# Patient Record
Sex: FEMALE | Race: BLACK OR AFRICAN AMERICAN | Employment: FULL TIME | URBAN - METROPOLITAN AREA
[De-identification: names, ages, dates, MRNs, and addresses within clinical notes are randomized per-mention and may not be internally consistent; named-entity substitution may affect disease eponyms.]

---

## 2022-08-24 ENCOUNTER — HOSPITAL ENCOUNTER (EMERGENCY)
Age: 62
Discharge: HOME | End: 2022-08-25
Attending: EMERGENCY MEDICINE
Payer: OTHER GOVERNMENT

## 2022-08-24 ENCOUNTER — APPOINTMENT (OUTPATIENT)
Dept: GENERAL RADIOLOGY | Age: 62
End: 2022-08-24
Attending: EMERGENCY MEDICINE
Payer: OTHER GOVERNMENT

## 2022-08-24 DIAGNOSIS — M62.82 STATIN-INDUCED RHABDOMYOLYSIS: ICD-10-CM

## 2022-08-24 DIAGNOSIS — R07.89 ATYPICAL CHEST PAIN: Primary | ICD-10-CM

## 2022-08-24 DIAGNOSIS — K21.9 GASTROESOPHAGEAL REFLUX DISEASE, UNSPECIFIED WHETHER ESOPHAGITIS PRESENT: ICD-10-CM

## 2022-08-24 DIAGNOSIS — R74.8 ELEVATED CPK: ICD-10-CM

## 2022-08-24 DIAGNOSIS — E87.6 HYPOKALEMIA: ICD-10-CM

## 2022-08-24 DIAGNOSIS — T46.6X5A STATIN-INDUCED RHABDOMYOLYSIS: ICD-10-CM

## 2022-08-24 LAB
ALBUMIN SERPL-MCNC: 3.9 G/DL (ref 3.4–5)
ALBUMIN/GLOB SERPL: 0.8 {RATIO} (ref 0.8–1.7)
ALP SERPL-CCNC: 68 U/L (ref 45–117)
ALT SERPL-CCNC: 40 U/L (ref 13–56)
ANION GAP SERPL CALC-SCNC: 8 MMOL/L (ref 3–18)
AST SERPL-CCNC: 43 U/L (ref 10–38)
BASOPHILS # BLD: 0.1 K/UL (ref 0–0.1)
BASOPHILS NFR BLD: 1 % (ref 0–2)
BILIRUB SERPL-MCNC: 0.9 MG/DL (ref 0.2–1)
BUN SERPL-MCNC: 20 MG/DL (ref 7–18)
BUN/CREAT SERPL: 19 (ref 12–20)
CALCIUM SERPL-MCNC: 10.2 MG/DL (ref 8.5–10.1)
CHLORIDE SERPL-SCNC: 100 MMOL/L (ref 100–111)
CK SERPL-CCNC: 956 U/L (ref 26–192)
CO2 SERPL-SCNC: 30 MMOL/L (ref 21–32)
CREAT SERPL-MCNC: 1.03 MG/DL (ref 0.6–1.3)
DIFFERENTIAL METHOD BLD: ABNORMAL
EOSINOPHIL # BLD: 0.1 K/UL (ref 0–0.4)
EOSINOPHIL NFR BLD: 2 % (ref 0–5)
ERYTHROCYTE [DISTWIDTH] IN BLOOD BY AUTOMATED COUNT: 13.9 % (ref 11.6–14.5)
GLOBULIN SER CALC-MCNC: 4.8 G/DL (ref 2–4)
GLUCOSE SERPL-MCNC: 112 MG/DL (ref 74–99)
HCT VFR BLD AUTO: 42 % (ref 35–45)
HGB BLD-MCNC: 13.9 G/DL (ref 12–16)
IMM GRANULOCYTES # BLD AUTO: 0 K/UL (ref 0–0.04)
IMM GRANULOCYTES NFR BLD AUTO: 0 % (ref 0–0.5)
LIPASE SERPL-CCNC: 138 U/L (ref 73–393)
LYMPHOCYTES # BLD: 2.7 K/UL (ref 0.9–3.6)
LYMPHOCYTES NFR BLD: 39 % (ref 21–52)
MCH RBC QN AUTO: 28.3 PG (ref 24–34)
MCHC RBC AUTO-ENTMCNC: 33.1 G/DL (ref 31–37)
MCV RBC AUTO: 85.5 FL (ref 78–100)
MONOCYTES # BLD: 0.6 K/UL (ref 0.05–1.2)
MONOCYTES NFR BLD: 9 % (ref 3–10)
NEUTS SEG # BLD: 3.4 K/UL (ref 1.8–8)
NEUTS SEG NFR BLD: 49 % (ref 40–73)
NRBC # BLD: 0 K/UL (ref 0–0.01)
NRBC BLD-RTO: 0 PER 100 WBC
PLATELET # BLD AUTO: 422 K/UL (ref 135–420)
PMV BLD AUTO: 9.5 FL (ref 9.2–11.8)
POTASSIUM SERPL-SCNC: 3.1 MMOL/L (ref 3.5–5.5)
PROT SERPL-MCNC: 8.7 G/DL (ref 6.4–8.2)
RBC # BLD AUTO: 4.91 M/UL (ref 4.2–5.3)
SODIUM SERPL-SCNC: 138 MMOL/L (ref 136–145)
TROPONIN-HIGH SENSITIVITY: 8 NG/L (ref 0–54)
WBC # BLD AUTO: 6.9 K/UL (ref 4.6–13.2)

## 2022-08-24 PROCEDURE — 93005 ELECTROCARDIOGRAM TRACING: CPT

## 2022-08-24 PROCEDURE — 96374 THER/PROPH/DIAG INJ IV PUSH: CPT

## 2022-08-24 PROCEDURE — 71045 X-RAY EXAM CHEST 1 VIEW: CPT

## 2022-08-24 PROCEDURE — 83690 ASSAY OF LIPASE: CPT

## 2022-08-24 PROCEDURE — 85025 COMPLETE CBC W/AUTO DIFF WBC: CPT

## 2022-08-24 PROCEDURE — 82550 ASSAY OF CK (CPK): CPT

## 2022-08-24 PROCEDURE — 74011250636 HC RX REV CODE- 250/636: Performed by: EMERGENCY MEDICINE

## 2022-08-24 PROCEDURE — 74011000250 HC RX REV CODE- 250: Performed by: EMERGENCY MEDICINE

## 2022-08-24 PROCEDURE — 84484 ASSAY OF TROPONIN QUANT: CPT

## 2022-08-24 PROCEDURE — 80053 COMPREHEN METABOLIC PANEL: CPT

## 2022-08-24 PROCEDURE — 74011250637 HC RX REV CODE- 250/637: Performed by: EMERGENCY MEDICINE

## 2022-08-24 RX ORDER — ACETAMINOPHEN 325 MG/1
975 TABLET ORAL
Status: COMPLETED | OUTPATIENT
Start: 2022-08-24 | End: 2022-08-24

## 2022-08-24 RX ORDER — GUAIFENESIN 100 MG/5ML
324 LIQUID (ML) ORAL
Status: COMPLETED | OUTPATIENT
Start: 2022-08-24 | End: 2022-08-24

## 2022-08-24 RX ORDER — FAMOTIDINE 10 MG/ML
20 INJECTION INTRAVENOUS
Status: COMPLETED | OUTPATIENT
Start: 2022-08-24 | End: 2022-08-24

## 2022-08-24 RX ADMIN — ALUMINA, MAGNESIA, AND SIMETHICONE ORAL SUSPENSION REGULAR STRENGTH 40 ML: 1200; 1200; 120 SUSPENSION ORAL at 22:46

## 2022-08-24 RX ADMIN — ACETAMINOPHEN 975 MG: 325 TABLET ORAL at 22:46

## 2022-08-24 RX ADMIN — FAMOTIDINE 20 MG: 10 INJECTION, SOLUTION INTRAVENOUS at 22:46

## 2022-08-24 RX ADMIN — ASPIRIN 324 MG: 81 TABLET, CHEWABLE ORAL at 22:46

## 2022-08-25 VITALS
DIASTOLIC BLOOD PRESSURE: 69 MMHG | HEIGHT: 68 IN | BODY MASS INDEX: 26.52 KG/M2 | TEMPERATURE: 98.3 F | SYSTOLIC BLOOD PRESSURE: 113 MMHG | RESPIRATION RATE: 17 BRPM | OXYGEN SATURATION: 97 % | HEART RATE: 53 BPM | WEIGHT: 175 LBS

## 2022-08-25 LAB
ATRIAL RATE: 55 BPM
ATRIAL RATE: 67 BPM
CALCULATED P AXIS, ECG09: 0 DEGREES
CALCULATED P AXIS, ECG09: 48 DEGREES
CALCULATED R AXIS, ECG10: 9 DEGREES
CALCULATED R AXIS, ECG10: 9 DEGREES
CALCULATED T AXIS, ECG11: 26 DEGREES
CALCULATED T AXIS, ECG11: 28 DEGREES
DIAGNOSIS, 93000: NORMAL
DIAGNOSIS, 93000: NORMAL
P-R INTERVAL, ECG05: 164 MS
P-R INTERVAL, ECG05: 174 MS
Q-T INTERVAL, ECG07: 432 MS
Q-T INTERVAL, ECG07: 464 MS
QRS DURATION, ECG06: 96 MS
QRS DURATION, ECG06: 96 MS
QTC CALCULATION (BEZET), ECG08: 443 MS
QTC CALCULATION (BEZET), ECG08: 456 MS
TROPONIN-HIGH SENSITIVITY: 7 NG/L (ref 0–54)
VENTRICULAR RATE, ECG03: 55 BPM
VENTRICULAR RATE, ECG03: 67 BPM

## 2022-08-25 PROCEDURE — 96361 HYDRATE IV INFUSION ADD-ON: CPT

## 2022-08-25 PROCEDURE — 93005 ELECTROCARDIOGRAM TRACING: CPT

## 2022-08-25 PROCEDURE — 99285 EMERGENCY DEPT VISIT HI MDM: CPT

## 2022-08-25 PROCEDURE — 74011250636 HC RX REV CODE- 250/636: Performed by: EMERGENCY MEDICINE

## 2022-08-25 PROCEDURE — 74011250637 HC RX REV CODE- 250/637: Performed by: EMERGENCY MEDICINE

## 2022-08-25 PROCEDURE — 84484 ASSAY OF TROPONIN QUANT: CPT

## 2022-08-25 RX ORDER — PANTOPRAZOLE SODIUM 40 MG/1
40 TABLET, DELAYED RELEASE ORAL 2 TIMES DAILY
Qty: 60 TABLET | Refills: 0 | Status: SHIPPED | OUTPATIENT
Start: 2022-08-25 | End: 2022-08-25

## 2022-08-25 RX ORDER — PANTOPRAZOLE SODIUM 40 MG/1
40 TABLET, DELAYED RELEASE ORAL 2 TIMES DAILY
Qty: 60 TABLET | Refills: 0 | Status: SHIPPED | OUTPATIENT
Start: 2022-08-25 | End: 2022-09-24

## 2022-08-25 RX ADMIN — POTASSIUM BICARBONATE 40 MEQ: 782 TABLET, EFFERVESCENT ORAL at 00:27

## 2022-08-25 RX ADMIN — SODIUM CHLORIDE 500 ML: 9 INJECTION, SOLUTION INTRAVENOUS at 00:27

## 2022-08-25 NOTE — ED TRIAGE NOTES
Pt arrives to ed reporting mid sternal chest pain that began this morning and has been off and on all day.

## 2022-08-25 NOTE — ED NOTES
C/O constant midsternal CP beginning this AM and increasing throughout the day. Denies PMH/Cardiac hx; Endorses increased SOB;  Denies radiation of pain/fever/chills/N/V.

## 2022-08-25 NOTE — ED PROVIDER NOTES
EMERGENCY DEPARTMENT HISTORY AND PHYSICAL EXAM    Date: 8/24/2022  Patient Name: Sander Griffith    History of Presenting Illness     Chief Complaint   Patient presents with    Chest Pain         History Provided By: Patient    Additional History (Context):   10:20 PM  Sander Griffith is a 65 y.o. female with PMHX of HTN, hyperlipidemia, hypothyroidism, migraines, COVID infection who presents to the emergency department C/O chest pain. Patient states she woke this morning she has had waxing and waning pain throughout the day. She describes it as a burning sensation and soreness without provocation. Happens at rest or when she is exerting herself. She also notes that its worse when lying flat. Denies any heart disease or family history of premature heart disease. She is not a smoker and has no PE risks of travel cancer recent surgery or prior events. She denies any nausea vomiting diarrhea shortness of breath diaphoresis or near syncopal episodes. Social History  No smoking drinking or drugs    Family History  Negative for premature heart disease. Negative for PEs or pulmonary disease. PCP: None    Current Outpatient Medications   Medication Sig Dispense Refill    pantoprazole (Protonix) 40 mg tablet Take 1 Tablet by mouth two (2) times a day for 30 days. 60 Tablet 0       Past History     Past Medical History:  Past Medical History:   Diagnosis Date    Menopause        Past Surgical History:  No past surgical history on file. Family History:  No family history on file. Social History: Allergies: Allergies   Allergen Reactions    Naproxen Swelling         Review of Systems   Review of Systems   Constitutional: Negative. HENT: Negative. Eyes: Negative. Respiratory: Negative. Cardiovascular:  Positive for chest pain. Gastrointestinal: Negative. Endocrine: Negative. Genitourinary: Negative. Musculoskeletal:  Positive for back pain and myalgias. Skin: Negative. Allergic/Immunologic: Negative. Neurological: Negative. Hematological: Negative. Psychiatric/Behavioral: Negative. All other systems reviewed and are negative. Physical Exam     Vitals:    08/24/22 2344 08/24/22 2358 08/25/22 0028 08/25/22 0059   BP:  123/68 117/67 113/69   Pulse: (!) 55 (!) 52 60 (!) 53   Resp: 18 16 14 17   Temp:       SpO2: 98% 99% 98% 97%   Weight:       Height:         Physical Exam  Vitals and nursing note reviewed. Constitutional:       General: She is not in acute distress. Appearance: She is well-developed. She is not ill-appearing, toxic-appearing or diaphoretic. HENT:      Head: Normocephalic and atraumatic. Eyes:      General: No scleral icterus. Extraocular Movements:      Right eye: Normal extraocular motion. Left eye: Normal extraocular motion. Conjunctiva/sclera: Conjunctivae normal.      Pupils: Pupils are equal, round, and reactive to light. Neck:      Trachea: No tracheal deviation. Cardiovascular:      Rate and Rhythm: Normal rate and regular rhythm. Heart sounds: Normal heart sounds. Pulmonary:      Effort: Pulmonary effort is normal. No respiratory distress. Breath sounds: Normal breath sounds. No stridor. Abdominal:      General: Bowel sounds are normal. There is no distension. Palpations: Abdomen is soft. Tenderness: There is no abdominal tenderness. There is no rebound. Musculoskeletal:         General: No tenderness. Normal range of motion. Cervical back: Normal range of motion and neck supple. Comments: Grossly unremarkable without abnormalities   Skin:     General: Skin is warm and dry. Capillary Refill: Capillary refill takes less than 2 seconds. Findings: No erythema or rash. Neurological:      Mental Status: She is alert and oriented to person, place, and time. GCS: GCS eye subscore is 4. GCS verbal subscore is 5. GCS motor subscore is 6.       Cranial Nerves: No cranial nerve deficit. Motor: No weakness. Psychiatric:         Mood and Affect: Mood normal.         Behavior: Behavior normal.         Thought Content: Thought content normal.         Judgment: Judgment normal.     Diagnostic Study Results     Labs -  Recent Results (from the past 24 hour(s))   EKG, 12 LEAD, INITIAL    Collection Time: 08/24/22 10:07 PM   Result Value Ref Range    Ventricular Rate 67 BPM    Atrial Rate 67 BPM    P-R Interval 164 ms    QRS Duration 96 ms    Q-T Interval 432 ms    QTC Calculation (Bezet) 456 ms    Calculated P Axis 0 degrees    Calculated R Axis 9 degrees    Calculated T Axis 26 degrees    Diagnosis       Normal sinus rhythm  Possible Anterior infarct , age undetermined  Abnormal ECG  No previous ECGs available     CBC WITH AUTOMATED DIFF    Collection Time: 08/24/22 10:20 PM   Result Value Ref Range    WBC 6.9 4.6 - 13.2 K/uL    RBC 4.91 4.20 - 5.30 M/uL    HGB 13.9 12.0 - 16.0 g/dL    HCT 42.0 35.0 - 45.0 %    MCV 85.5 78.0 - 100.0 FL    MCH 28.3 24.0 - 34.0 PG    MCHC 33.1 31.0 - 37.0 g/dL    RDW 13.9 11.6 - 14.5 %    PLATELET 593 (H) 131 - 420 K/uL    MPV 9.5 9.2 - 11.8 FL    NRBC 0.0 0  WBC    ABSOLUTE NRBC 0.00 0.00 - 0.01 K/uL    NEUTROPHILS 49 40 - 73 %    LYMPHOCYTES 39 21 - 52 %    MONOCYTES 9 3 - 10 %    EOSINOPHILS 2 0 - 5 %    BASOPHILS 1 0 - 2 %    IMMATURE GRANULOCYTES 0 0.0 - 0.5 %    ABS. NEUTROPHILS 3.4 1.8 - 8.0 K/UL    ABS. LYMPHOCYTES 2.7 0.9 - 3.6 K/UL    ABS. MONOCYTES 0.6 0.05 - 1.2 K/UL    ABS. EOSINOPHILS 0.1 0.0 - 0.4 K/UL    ABS. BASOPHILS 0.1 0.0 - 0.1 K/UL    ABS. IMM.  GRANS. 0.0 0.00 - 0.04 K/UL    DF AUTOMATED     METABOLIC PANEL, COMPREHENSIVE    Collection Time: 08/24/22 10:20 PM   Result Value Ref Range    Sodium 138 136 - 145 mmol/L    Potassium 3.1 (L) 3.5 - 5.5 mmol/L    Chloride 100 100 - 111 mmol/L    CO2 30 21 - 32 mmol/L    Anion gap 8 3.0 - 18 mmol/L    Glucose 112 (H) 74 - 99 mg/dL    BUN 20 (H) 7.0 - 18 MG/DL Creatinine 1.03 0.6 - 1.3 MG/DL    BUN/Creatinine ratio 19 12 - 20      GFR est AA >60 >60 ml/min/1.73m2    GFR est non-AA 54 (L) >60 ml/min/1.73m2    Calcium 10.2 (H) 8.5 - 10.1 MG/DL    Bilirubin, total 0.9 0.2 - 1.0 MG/DL    ALT (SGPT) 40 13 - 56 U/L    AST (SGOT) 43 (H) 10 - 38 U/L    Alk. phosphatase 68 45 - 117 U/L    Protein, total 8.7 (H) 6.4 - 8.2 g/dL    Albumin 3.9 3.4 - 5.0 g/dL    Globulin 4.8 (H) 2.0 - 4.0 g/dL    A-G Ratio 0.8 0.8 - 1.7     TROPONIN-HIGH SENSITIVITY    Collection Time: 08/24/22 10:20 PM   Result Value Ref Range    Troponin-High Sensitivity 8 0 - 54 ng/L   LIPASE    Collection Time: 08/24/22 10:20 PM   Result Value Ref Range    Lipase 138 73 - 393 U/L   CK    Collection Time: 08/24/22 10:20 PM   Result Value Ref Range     (H) 26 - 192 U/L   EKG, 12 LEAD, SUBSEQUENT    Collection Time: 08/25/22 12:23 AM   Result Value Ref Range    Ventricular Rate 55 BPM    Atrial Rate 55 BPM    P-R Interval 174 ms    QRS Duration 96 ms    Q-T Interval 464 ms    QTC Calculation (Bezet) 443 ms    Calculated P Axis 48 degrees    Calculated R Axis 9 degrees    Calculated T Axis 28 degrees    Diagnosis       Sinus bradycardia  Otherwise normal ECG  When compared with ECG of 24-AUG-2022 22:07,  No significant change was found     TROPONIN-HIGH SENSITIVITY    Collection Time: 08/25/22 12:25 AM   Result Value Ref Range    Troponin-High Sensitivity 7 0 - 54 ng/L        Radiologic Studies -   XR CHEST PORT   Final Result      Negative radiographic examination. _______________         CT Results  (Last 48 hours)      None          CXR Results  (Last 48 hours)                 08/24/22 2237  XR CHEST PORT Final result    Impression:      Negative radiographic examination. _______________        Narrative:  EXAM: Portable upright chest radiograph. INDICATION: \"CP. \"       COMPARISON: None.       _______________       FINDINGS:          DEVICES:  None.            LUNGS:              --Expansion: Adequate. --Focal opacity:  None detected. --Diffuse abnormality:  None detected. PLEURAL SPACE:            --Pleural effusion:  None detected. --Pneumothorax:  None detected. MISCELLANEOUS:  None.       _______________                   Medications given in the ED-  Medications   aspirin chewable tablet 324 mg (324 mg Oral Given 8/24/22 2246)   famotidine (PF) (PEPCID) injection 20 mg (20 mg IntraVENous Given 8/24/22 2246)   mylanta/viscous lidocaine (GI COCKTAIL) (40 mL Oral Given 8/24/22 2246)   acetaminophen (TYLENOL) tablet 975 mg (975 mg Oral Given 8/24/22 2246)   sodium chloride 0.9 % bolus infusion 500 mL (0 mL IntraVENous IV Completed 8/25/22 0144)   potassium bicarb-citric acid (EFFER-K) tablet 40 mEq (40 mEq Oral Given 8/25/22 0027)         Medical Decision Making   I am the first provider for this patient. I reviewed the vital signs, available nursing notes, past medical history, past surgical history, family history and social history. Vital Signs-Reviewed the patient's vital signs. Pulse Oximetry Analysis - 97% on room air    Cardiac Monitor:  Rate: 62 bpm  Rhythm: Sinus rhythm    EKG #1 interpretation: (Preliminary)  10:07 PM    Normal sinus rhythm, rate 67, normal ST segments, she has flattening of T waves in precordial leads, QTC is 456. EKG read by Erum Mccain MD    EKG #1 interpretation: (Preliminary)  12:23 AM    Sinus bradycardia, rate 55, normal ST segments, she has flattening of T waves in precordial leads, QTC is 443. The rate is changed but the morphology is unchanged. EKG read by Erum Mccain MD  Records Reviewed: NURSING NOTES AND PREVIOUS MEDICAL RECORDS    Provider Notes (Medical Decision Making):   Patient with atypical type of chest pain. She also describes burning sensation worse with lying back consistent with reflux. Serial EKGs demonstrated no ischemic changes but consistent with hypokalemia. Additionally while her troponins were unremarkable her CK was up without stressors of dehydration medications drug abuse. She is on a statin and this can cause her minimal rhabdomyolysis. We discussed treatment for this. Follow-up with her regular doctor. We gave her information for follow-up for outpatient cardiac study for stress test.  She understands importance of early follow-up for potential cardiac risk. Procedures:  Procedures    ED Course:   10:20 PM : Initial assessment performed. The patients presenting problems have been discussed, and they are in agreement with the care plan formulated and outlined with them. I have encouraged them to ask questions as they arise throughout their visit. Diagnosis and Disposition       DISCHARGE NOTE:  1:04 PM  Bennie Monge's  results have been reviewed with her. She has been counseled regarding her diagnosis, treatment, and plan. She verbally conveys understanding and agreement of the signs, symptoms, diagnosis, treatment and prognosis and additionally agrees to follow up as discussed. She also agrees with the care-plan and conveys that all of her questions have been answered. I have also provided discharge instructions for her that include: educational information regarding their diagnosis and treatment, and list of reasons why they would want to return to the ED prior to their follow-up appointment, should her condition change. She has been provided with education for proper emergency department utilization. CLINICAL IMPRESSION:    1. Atypical chest pain    2. Gastroesophageal reflux disease, unspecified whether esophagitis present    3. Elevated CPK    4. Statin-induced rhabdomyolysis    5. Hypokalemia        PLAN:  1. D/C Home  2. There are no discharge medications for this patient.     3.   Follow-up Information       Follow up With Specialties Details Why Layton Campa MD Cardiovascular Disease Physician, Internal Medicine Physician   [address]  [address]  [phone]      [facility] EMERGENCY DEPT Emergency Medicine Go to  As needed [address] [facility] [id number]  [phone]          _______________________________    This note was partially transcribed via voice recognition software. Although efforts have been made to catch any discrepancies, it may contain sound alike words, grammatical errors, or nonsensical words.